# Patient Record
Sex: FEMALE | Race: WHITE | NOT HISPANIC OR LATINO | URBAN - METROPOLITAN AREA
[De-identification: names, ages, dates, MRNs, and addresses within clinical notes are randomized per-mention and may not be internally consistent; named-entity substitution may affect disease eponyms.]

---

## 2019-10-04 ENCOUNTER — EMERGENCY (EMERGENCY)
Facility: HOSPITAL | Age: 54
LOS: 0 days | Discharge: HOME | End: 2019-10-04
Attending: EMERGENCY MEDICINE | Admitting: EMERGENCY MEDICINE
Payer: COMMERCIAL

## 2019-10-04 VITALS
TEMPERATURE: 98 F | DIASTOLIC BLOOD PRESSURE: 72 MMHG | OXYGEN SATURATION: 97 % | RESPIRATION RATE: 17 BRPM | SYSTOLIC BLOOD PRESSURE: 98 MMHG | HEART RATE: 102 BPM

## 2019-10-04 DIAGNOSIS — B02.9 ZOSTER WITHOUT COMPLICATIONS: ICD-10-CM

## 2019-10-04 DIAGNOSIS — R21 RASH AND OTHER NONSPECIFIC SKIN ERUPTION: ICD-10-CM

## 2019-10-04 PROCEDURE — 99284 EMERGENCY DEPT VISIT MOD MDM: CPT

## 2019-10-04 RX ORDER — VALACYCLOVIR 500 MG/1
1 TABLET, FILM COATED ORAL
Qty: 21 | Refills: 0
Start: 2019-10-04 | End: 2019-10-10

## 2019-10-04 NOTE — ED PROVIDER NOTE - NSFOLLOWUPINSTRUCTIONS_ED_ALL_ED_FT
Shingles    Shingles is an infection that causes a painful skin rash and fluid-filled blisters. Shingles is caused by the same virus that causes chickenpox.     Shingles only develops in people who:    Have had chickenpox.  Have gotten the chickenpox vaccine. (This is rare.)    The first symptoms of shingles may be itching, tingling, or pain in an area on your skin. A rash will follow in a few days or weeks. The rash is usually on one side of the body in a bandlike or beltlike pattern. Over time, the rash turns into fluid-filled blisters that break open, scab over, and dry up. Medicines may:    Help you manage pain.  Help you recover more quickly.  Help to prevent long-term problems.    HOME CARE  Medicines     Take medicines only as told by your doctor.  Apply an anti-itch or numbing cream to the affected area as told by your doctor.    Blister and Rash Care     Take a cool bath or put cool compresses on the area of the rash or blisters as told by your doctor. This may help with pain and itching.  Keep your rash covered with a loose bandage (dressing). Wear loose-fitting clothing.  Keep your rash and blisters clean with mild soap and cool water or as told by your doctor.  Check your rash every day for signs of infection. These include redness, swelling, and pain that lasts or gets worse.  Do not pick your blisters.  Do not scratch your rash.    General Instructions     Rest as told by your doctor.  Keep all follow-up visits as told by your doctor. This is important.  Until your blisters scab over, your infection can cause chickenpox in people who have never had it or been vaccinated against it. To prevent this from happening, avoid touching other people or being around other people, especially:  Babies.  Pregnant women.  Children who have eczema.  Elderly people who have transplants.  People who have chronic illnesses, such as leukemia or AIDS.    GET HELP IF:  Your pain does not get better with medicine.  Your pain does not get better after the rash heals.  Your rash looks infected. Signs of infection include:  Redness.  Swelling.  Pain that lasts or gets worse.    GET HELP RIGHT AWAY IF:  The rash is on your face or nose.  You have pain in your face, pain around your eye area, or loss of feeling on one side of your face.  You have ear pain or you have ringing in your ear.  You have loss of taste.  Your condition gets worse.    ADDITIONAL NOTES AND INSTRUCTIONS    Please follow up with your Primary MD in 24-48 hr.  Seek immediate medical care for any new/worsening signs or symptoms.     Document Released: 6/5/2009 Document Revised: 4/10/2017 Document Reviewed: 9/29/2015  [employer] Interactive Patient Education ©2016 [employer] This information is not intended to replace advice given to you by your health care provider. Make sure you discuss any questions you have with your health care provider.

## 2019-10-04 NOTE — ED PROVIDER NOTE - OBJECTIVE STATEMENT
Patient c/o rash to right side of abdomen for several days, No fever, H/o MS, had chickenpox as child, Mild burning at site,

## 2019-10-04 NOTE — ED PROVIDER NOTE - CONSTITUTIONAL, MLM
normal... Well appearing, well nourished, awake, alert, oriented to person, place, time/situation and in no apparent distress. in no apparent distress.

## 2019-10-04 NOTE — ED PROVIDER NOTE - ATTENDING CONTRIBUTION TO CARE
I was present for and supervised the key and critical aspects of the procedures performed during the care of the patient. Patient presents for evaluation of rash noted on the right lateral right flank onset over the past several days she denies any fevers or chills she notes burning pain, on physical exam the patient has a rash consistent with shingles noted.  started on po valtrex given lidoderm transdermal I will discharge at this tiem

## 2019-10-04 NOTE — ED PROVIDER NOTE - CLINICAL SUMMARY MEDICAL DECISION MAKING FREE TEXT BOX
Patient presents for evaluation of rash consistent with shingles at this time started on po valtrex and discharged at this time no fevers or chills noted we dicussed indications to return  I will discharge

## 2019-10-04 NOTE — ED PROVIDER NOTE - PATIENT PORTAL LINK FT
You can access the FollowMyHealth Patient Portal offered by [facility] by registering at the following website: [email or web address]. By joining [employer]’s FollowMyHealth portal, you will also be able to view your health information using other applications (apps) compatible with our system.